# Patient Record
Sex: FEMALE | Race: WHITE | NOT HISPANIC OR LATINO | Employment: OTHER | URBAN - METROPOLITAN AREA
[De-identification: names, ages, dates, MRNs, and addresses within clinical notes are randomized per-mention and may not be internally consistent; named-entity substitution may affect disease eponyms.]

---

## 2024-01-12 PROBLEM — E55.9 VITAMIN D DEFICIENCY: Status: ACTIVE | Noted: 2024-01-12

## 2024-01-12 PROBLEM — M45.9 ANKYLOSING SPONDYLITIS (MULTI): Status: ACTIVE | Noted: 2024-01-12

## 2024-01-12 PROBLEM — R53.83 FATIGUE: Status: ACTIVE | Noted: 2024-01-12

## 2024-01-12 PROBLEM — M47.816 ARTHRITIS OF LUMBAR SPINE: Status: ACTIVE | Noted: 2024-01-12

## 2024-01-12 PROBLEM — M54.50 LUMBAR PAIN WITH RADIATION DOWN LEG: Status: ACTIVE | Noted: 2024-01-12

## 2024-01-12 PROBLEM — M79.606 LUMBAR PAIN WITH RADIATION DOWN LEG: Status: ACTIVE | Noted: 2024-01-12

## 2024-01-12 PROBLEM — E66.9 OBESITY (BMI 30-39.9): Status: ACTIVE | Noted: 2024-01-12

## 2024-01-12 PROBLEM — K21.9 GERD (GASTROESOPHAGEAL REFLUX DISEASE): Status: ACTIVE | Noted: 2024-01-12

## 2024-01-12 PROBLEM — Z15.89 HLA B27 (HLA B27 POSITIVE): Status: ACTIVE | Noted: 2024-01-12

## 2024-01-12 RX ORDER — ADALIMUMAB 40MG/0.4ML
KIT SUBCUTANEOUS
COMMUNITY
Start: 2021-04-29

## 2024-01-12 RX ORDER — TURMERIC 400 MG
CAPSULE ORAL
COMMUNITY
Start: 2021-04-29

## 2024-01-15 ENCOUNTER — APPOINTMENT (OUTPATIENT)
Dept: PRIMARY CARE | Facility: CLINIC | Age: 34
End: 2024-01-15
Payer: COMMERCIAL

## 2024-01-16 ENCOUNTER — APPOINTMENT (OUTPATIENT)
Dept: PRIMARY CARE | Facility: CLINIC | Age: 34
End: 2024-01-16
Payer: COMMERCIAL

## 2024-01-17 ENCOUNTER — OFFICE VISIT (OUTPATIENT)
Dept: PRIMARY CARE | Facility: CLINIC | Age: 34
End: 2024-01-17
Payer: COMMERCIAL

## 2024-01-17 VITALS
DIASTOLIC BLOOD PRESSURE: 80 MMHG | BODY MASS INDEX: 34.38 KG/M2 | HEART RATE: 96 BPM | SYSTOLIC BLOOD PRESSURE: 120 MMHG | HEIGHT: 63 IN | WEIGHT: 194 LBS | OXYGEN SATURATION: 95 %

## 2024-01-17 DIAGNOSIS — F41.9 ANXIETY: Primary | ICD-10-CM

## 2024-01-17 DIAGNOSIS — R00.2 PALPITATIONS: ICD-10-CM

## 2024-01-17 DIAGNOSIS — R30.0 DYSURIA: ICD-10-CM

## 2024-01-17 LAB
POC APPEARANCE, URINE: CLEAR
POC BILIRUBIN, URINE: NEGATIVE
POC BLOOD, URINE: NEGATIVE
POC COLOR, URINE: ABNORMAL
POC GLUCOSE, URINE: NEGATIVE MG/DL
POC KETONES, URINE: NEGATIVE MG/DL
POC LEUKOCYTES, URINE: NEGATIVE
POC NITRITE,URINE: NEGATIVE
POC PH, URINE: 7.5 PH
POC PROTEIN, URINE: NEGATIVE MG/DL
POC SPECIFIC GRAVITY, URINE: 1.02
POC UROBILINOGEN, URINE: 0.2 EU/DL

## 2024-01-17 PROCEDURE — 99214 OFFICE O/P EST MOD 30 MIN: CPT | Performed by: NURSE PRACTITIONER

## 2024-01-17 PROCEDURE — 1036F TOBACCO NON-USER: CPT | Performed by: NURSE PRACTITIONER

## 2024-01-17 PROCEDURE — 81002 URINALYSIS NONAUTO W/O SCOPE: CPT | Performed by: NURSE PRACTITIONER

## 2024-01-17 PROCEDURE — 87086 URINE CULTURE/COLONY COUNT: CPT

## 2024-01-17 RX ORDER — METOPROLOL TARTRATE 25 MG/1
25 TABLET, FILM COATED ORAL 2 TIMES DAILY
Qty: 60 TABLET | Refills: 5 | Status: SHIPPED | OUTPATIENT
Start: 2024-01-17 | End: 2024-07-15

## 2024-01-17 RX ORDER — PHENAZOPYRIDINE HYDROCHLORIDE 200 MG/1
TABLET, FILM COATED ORAL
COMMUNITY
Start: 2024-01-12 | End: 2024-02-26 | Stop reason: ALTCHOICE

## 2024-01-17 RX ORDER — FAMOTIDINE 20 MG/1
20 TABLET, FILM COATED ORAL 2 TIMES DAILY
COMMUNITY
Start: 2023-05-05

## 2024-01-17 RX ORDER — ALPRAZOLAM 0.25 MG/1
0.25 TABLET ORAL 2 TIMES DAILY PRN
Qty: 14 TABLET | Refills: 0 | Status: SHIPPED | OUTPATIENT
Start: 2024-01-17 | End: 2024-02-01 | Stop reason: SDUPTHER

## 2024-01-17 RX ORDER — HYDROXYZINE HYDROCHLORIDE 25 MG/1
25 TABLET, FILM COATED ORAL EVERY 6 HOURS PRN
COMMUNITY
Start: 2024-01-12 | End: 2024-02-26 | Stop reason: ALTCHOICE

## 2024-01-17 ASSESSMENT — PATIENT HEALTH QUESTIONNAIRE - PHQ9
SUM OF ALL RESPONSES TO PHQ9 QUESTIONS 1 & 2: 0
1. LITTLE INTEREST OR PLEASURE IN DOING THINGS: NOT AT ALL
2. FEELING DOWN, DEPRESSED OR HOPELESS: NOT AT ALL

## 2024-01-17 NOTE — PROGRESS NOTES
"Subjective   Patient ID: Aimee Tanner is a 33 y.o. female who presents for anxiety.    HPI     Pt tx at [facility] 1/12/24 for c/o anxiety and possible UTI.   CBC, CMP, TSH, UA completed and WNL    Anxiety increased last week  She was discharged with Hydroxyzine and Pyridium.   Provided a referral for psychiatry and counseling.     Still with UTI symptoms like urgency and incomplete bowel emptying. Is on Humira and feels she is prone to UTI.   Increasing water, Cranberry D-Mannose     Anxiety and Palpitations:   Childhood started as child. Never has tx.   Has taken Hydroxyzine 3 times but it did not work.   She has already made contact with a counselor for an appointment  Denies suicidal or homicidal thoughts    Stop caffeine  Water: tons  Nicotine or vape: Neg    Review of Systems    Objective   /80   Pulse 96   Ht 1.607 m (5' 3.25\")   Wt 88 kg (194 lb)   SpO2 95%   BMI 34.09 kg/m²     Physical Exam    Alert and oriented x 3  Appears healthy  Does not appear/sound dyspneic with conversation  Speech clear.  Hearing adequate.  Psych: Normal affect. Good judgment and insight.       Assessment/Plan     1. Dysuria  - POCT UA (nonautomated) manually resulted  - Urine Culture; Future  - Urine Culture    2. Anxiety  Discussed meds and plan of action  Aware at any time if thoughts of suicide or homicide are present contact medical services immediately.    - metoprolol tartrate (Lopressor) 25 mg tablet; Take 1 tablet (25 mg) by mouth 2 times a day.  Dispense: 60 tablet; Refill: 5  - ALPRAZolam (Xanax) 0.25 mg tablet; Take 1 tablet (0.25 mg) by mouth 2 times a day as needed for anxiety.  Dispense: 14 tablet; Refill: 0    3. Palpitations  Discussed meds and plan of action  - metoprolol tartrate (Lopressor) 25 mg tablet; Take 1 tablet (25 mg) by mouth 2 times a day.  Dispense: 60 tablet; Refill: 5  - ALPRAZolam (Xanax) 0.25 mg tablet; Take 1 tablet (0.25 mg) by mouth 2 times a day as needed for anxiety.  " Dispense: 14 tablet; Refill: 0    Follow up: for anxiety in the next 8 weeks    Medications refills will be completed as discussed.     Any labs or testing that is ordered will be reviewed and the results will be in your chart .   You can review these via  [employer].     Prescriptions will not be filled unless you are compliant with your follow-up appointments or have a follow-up appointment scheduled as per the instruction of your provider. Refills for medications should be requested at the time of your office visit.     Please allow one week for refill requests to be completed.     Contact office with any questions or concerns.   Preferred communication is via  [employer]  Please contact [email or web address] if having issues with  [employer]    Kasia Donnelly [facility]  [address], [address]  [place] [address]  Phone: [phone]    **Charting was completed using voice recognition technology and may include unintended errors**

## 2024-01-18 LAB — BACTERIA UR CULT: NORMAL

## 2024-01-25 ENCOUNTER — TELEPHONE (OUTPATIENT)
Dept: PRIMARY CARE | Facility: CLINIC | Age: 34
End: 2024-01-25
Payer: COMMERCIAL

## 2024-01-25 DIAGNOSIS — R30.0 DYSURIA: Primary | ICD-10-CM

## 2024-01-25 RX ORDER — NITROFURANTOIN 25; 75 MG/1; MG/1
100 CAPSULE ORAL 2 TIMES DAILY
Qty: 10 CAPSULE | Refills: 0 | Status: SHIPPED | OUTPATIENT
Start: 2024-01-25 | End: 2024-01-30

## 2024-01-25 NOTE — TELEPHONE ENCOUNTER
Aimee called to see if you had the results from the urine test yet.  She is trying to set up her my chart right now.  Please advise.

## 2024-02-01 ENCOUNTER — TELEPHONE (OUTPATIENT)
Dept: PRIMARY CARE | Facility: CLINIC | Age: 34
End: 2024-02-01
Payer: COMMERCIAL

## 2024-02-01 DIAGNOSIS — F41.9 ANXIETY: ICD-10-CM

## 2024-02-01 DIAGNOSIS — R00.2 PALPITATIONS: ICD-10-CM

## 2024-02-01 RX ORDER — ALPRAZOLAM 0.25 MG/1
0.25 TABLET ORAL DAILY PRN
Qty: 14 TABLET | Refills: 0 | Status: SHIPPED | OUTPATIENT
Start: 2024-02-01 | End: 2024-03-06 | Stop reason: SDUPTHER

## 2024-02-01 NOTE — TELEPHONE ENCOUNTER
Pt is calling and wanted to know if she can get a refill on her Alprazolam 0.25 MG? Pt states that she is leaving for vacation on Sunday and wanted to know if this can be sent today if possible so she can pick it up before she leaves?     Medication:     Alprazolam 0.25 MG; Take 1 tablet 2 times a day as needed for anxiety.    Pharmacy: [facility]   Phone Number: [phone]    LR: 1-17-24     14 tablets with 0 refills   LV: 1-17-24  NV: 2-26-24

## 2024-02-23 PROBLEM — R14.0 ABDOMINAL BLOATING: Status: ACTIVE | Noted: 2024-02-23

## 2024-02-26 ENCOUNTER — OFFICE VISIT (OUTPATIENT)
Dept: PRIMARY CARE | Facility: CLINIC | Age: 34
End: 2024-02-26
Payer: COMMERCIAL

## 2024-02-26 VITALS
SYSTOLIC BLOOD PRESSURE: 118 MMHG | HEIGHT: 63 IN | DIASTOLIC BLOOD PRESSURE: 70 MMHG | WEIGHT: 189 LBS | BODY MASS INDEX: 33.49 KG/M2

## 2024-02-26 DIAGNOSIS — F41.9 ANXIETY: ICD-10-CM

## 2024-02-26 DIAGNOSIS — K21.9 GASTROESOPHAGEAL REFLUX DISEASE WITHOUT ESOPHAGITIS: ICD-10-CM

## 2024-02-26 DIAGNOSIS — M45.9 ANKYLOSING SPONDYLITIS, UNSPECIFIED SITE OF SPINE (MULTI): ICD-10-CM

## 2024-02-26 DIAGNOSIS — R00.2 PALPITATIONS: Primary | ICD-10-CM

## 2024-02-26 PROBLEM — R30.0 DYSURIA: Status: RESOLVED | Noted: 2024-01-17 | Resolved: 2024-02-26

## 2024-02-26 PROCEDURE — 1036F TOBACCO NON-USER: CPT | Performed by: NURSE PRACTITIONER

## 2024-02-26 PROCEDURE — 99213 OFFICE O/P EST LOW 20 MIN: CPT | Performed by: NURSE PRACTITIONER

## 2024-02-26 RX ORDER — OMEPRAZOLE 20 MG/1
20 TABLET, DELAYED RELEASE ORAL
COMMUNITY

## 2024-02-26 NOTE — PROGRESS NOTES
"Subjective   Patient ID: Aimee To is a 33 y.o. female who presents for 1 mo follow up on anxiety.    HPI   Presents for follow up for anxiety and palpitations.  Last visit she was started on Metoprolol Tartrate. States she is taking it 2 times a day with great results.   She initially was taking Xanax but has not needed it in weeks.  She has met with a counselor and plans on meeting weekly.   Very happy with overall results    Ankylosing spondylitis:  Rheum Dr Huang Barriga   Med: Humira    GERD: GI [place] Waleska Angeles CNP  Med: Omeprazole, Pepcid        Review of Systems    Objective   /70   Ht 1.607 m (5' 3.25\")   Wt 85.7 kg (189 lb)   BMI 33.22 kg/m²     Physical Exam    Alert and oriented x 3  Appears healthy  Does not appear/sound dyspneic with conversation  Speech clear.  Hearing adequate.  Psych: Normal affect. Good judgment and insight.       Assessment/Plan     1. Ankylosing spondylitis, unspecified site of spine (CMS/HCC)  Follow-up with specialist per their discretion/direction.     2. Palpitations  Stable.  Continue current medications.    3. Anxiety  Stable.  Continue current medications.    4. Gastroesophageal reflux disease without esophagitis  Follow-up with specialist per their discretion/direction.     Follow up: CPE     Medications refills will be completed as discussed.     Any labs or testing that is ordered will be reviewed and the results will be in your chart .   You can review these via  [employer].     Prescriptions will not be filled unless you are compliant with your follow-up appointments or have a follow-up appointment scheduled as per the instruction of your provider. Refills for medications should be requested at the time of your office visit.     Please allow one week for refill requests to be completed.     Contact office with any questions or concerns.   Preferred communication is via  [employer]  Please contact [email or web address] if having issues " with  Savi Donnelly [facility]  [address], [address]  [place] [address]  Phone: [phone]    **Charting was completed using voice recognition technology and may include unintended errors**

## 2024-03-06 DIAGNOSIS — R00.2 PALPITATIONS: ICD-10-CM

## 2024-03-06 DIAGNOSIS — F41.9 ANXIETY: ICD-10-CM

## 2024-03-06 RX ORDER — ALPRAZOLAM 0.25 MG/1
0.25 TABLET ORAL DAILY PRN
Qty: 7 TABLET | Refills: 0 | Status: SHIPPED | OUTPATIENT
Start: 2024-03-06

## 2024-04-03 ENCOUNTER — APPOINTMENT (OUTPATIENT)
Dept: PRIMARY CARE | Facility: CLINIC | Age: 34
End: 2024-04-03
Payer: COMMERCIAL

## 2024-04-03 NOTE — PROGRESS NOTES
Annual Comprehensive Medical Exam:    33 y.o. female presents for annual comprehensive medical evaluation and preventive services screening.      Recent hospitalizations, surgeries or ER visits: denies     Diet:  healthy      unhealthy     mix of healthy and unhealthy  Caffeine per day:   Water per day: lots  Exercise:  Alcohol:  Tobacco:  quit   Pap/Pelvic:   Mammogram:   Colonoscopy:   date                 f/u  Cologuard:    Allowed to report any questions or concerns.    Anxiety:   Med: Metoprolol Tartrate  Controlled substance: Xanax  Xanax Last filled: 3/6/24  Xanax Last taken:   Meeting with counselor    Ankylosing spondylitis:  Rheum Dr Huang Barriga   Med: Humira     GERD: GI [place] Waleska Angeles CNP  Med: Omeprazole, Pepcid    Past Medical History:   Diagnosis Date    Other conditions influencing health status 2020    Patient denies significant medical history      Past Surgical History:   Procedure Laterality Date    OTHER SURGICAL HISTORY  2020    No history of surgery     No family history on file.   Social History     Socioeconomic History    Marital status:      Spouse name: Not on file    Number of children: Not on file    Years of education: Not on file    Highest education level: Not on file   Occupational History    Not on file   Tobacco Use    Smoking status: Former     Types: Cigarettes     Quit date:      Years since quittin.2    Smokeless tobacco: Never   Substance and Sexual Activity    Alcohol use: Yes    Drug use: Never    Sexual activity: Not on file   Other Topics Concern    Not on file   Social History Narrative    Not on file     Social Determinants of Health     Financial Resource Strain: Not on file   Food Insecurity: Not on file   Transportation Needs: Not on file   Physical Activity: Not on file   Stress: Not on file   Social Connections: Not on file   Intimate Partner Violence: Not on file   Housing Stability: Not on file       Current Outpatient  Medications on File Prior to Visit   Medication Sig Dispense Refill    adalimumab (Humira,CF,) 40 mg/0.4 mL syringe kit prefilled syringe Inject under the skin.      ALPRAZolam (Xanax) 0.25 mg tablet Take 1 tablet (0.25 mg) by mouth once daily as needed for anxiety. 7 tablet 0    cranberry-vitamin C-mannose 250-30-50 mg tablet,chewable Chew.      famotidine (Pepcid) 20 mg tablet Take 1 tablet (20 mg) by mouth 2 times a day. AS DIRECTED      metoprolol tartrate (Lopressor) 25 mg tablet Take 1 tablet (25 mg) by mouth 2 times a day. 60 tablet 5    omeprazole OTC (PriLOSEC OTC) 20 mg EC tablet Take 1 tablet (20 mg) by mouth once daily in the morning. Take before meals. Do not crush, chew, or split.      turmeric 400 mg capsule Take by mouth.       No current facility-administered medications on file prior to visit.       Allergies   Allergen Reactions    Azithromycin Hives and Rash     Prescribed by outside MD, [facility]    Mold Other    Penicillins Hives and Unknown    Sulfa (Sulfonamide Antibiotics) Hives and Unknown    Clindamycin Hives       ROS:   Refer to HPI  Denies fever, CP, SOB, headaches or GI upset    Visit Vitals  Smoking Status Former        Physical Exam  Gen: Alert and oriented x3 female in no acute distress.  HEENT: Head is normocephalic.  Pupils equal and reactive to light.  Tympanic membranes are clear.  Neck is supple without adenopathy or carotid bruits.  Heart: Regular rate and rhythm without murmurs.  Lungs: Clear to auscultation bilaterally.  Breasts: Normal appearance, no nipple discharge. Palpation of breast and axillae normal. No palpable mass and no axillary lymphadenopathy.   Breast: Declined.            Deferred to Gyn  Pelvic: Declined.            Deferred to Gyn   Abdomen: Soft with normal bowel sounds.  No masses or pain to palpation.  No bruits auscultated.  Extremities: Good range of motion of all joints.  No significant edema. Pedal pulses +1-2/4  Neuro: No signs of focal  neurologic deficit.  No tremor.  Speech and hearing are normal.  DTRs +3/4;  Muscle Strength +5/5.  Musculoskeletal: Spine with good ROM.  Leg lengths are equal.  Skin: No significant or irregular nevi visualized.  Psych: normal affect.  No suicidal ideation.  Good judgment and insight.    Diagnosis/Plan:         Medications refills will be completed as discussed.     Any labs or testing that is ordered will be reviewed and the results will be in your chart .   You can review these via  [employer].     Follow up six months for medication management.     Prescriptions will not be filled unless you are compliant with your follow-up appointments or have a follow-up appointment scheduled as per the instruction of your provider. Refills for medications should be requested at the time of your office visit.     Please allow one week for refill requests to be completed.     Contact office with any questions or concerns.   Preferred communication is via  [employer]  Please contact [email or web address] if having issues with  [employer]    Kasia Donnelly [facility]  [address], [address]  [place] [address]  Phone: [phone]    **Charting was completed using voice recognition technology and may include unintended errors**

## 2024-05-06 ENCOUNTER — PATIENT MESSAGE (OUTPATIENT)
Dept: PRIMARY CARE | Facility: CLINIC | Age: 34
End: 2024-05-06
Payer: COMMERCIAL

## 2024-05-06 DIAGNOSIS — F41.9 ANXIETY: Primary | ICD-10-CM

## 2024-08-30 ENCOUNTER — PATIENT MESSAGE (OUTPATIENT)
Dept: PRIMARY CARE | Facility: CLINIC | Age: 34
End: 2024-08-30
Payer: COMMERCIAL

## 2024-08-30 DIAGNOSIS — R00.2 PALPITATIONS: ICD-10-CM

## 2024-08-30 DIAGNOSIS — F41.9 ANXIETY: ICD-10-CM

## 2024-09-04 RX ORDER — METOPROLOL TARTRATE 25 MG/1
25 TABLET, FILM COATED ORAL 2 TIMES DAILY
Qty: 60 TABLET | Refills: 5 | Status: SHIPPED | OUTPATIENT
Start: 2024-09-04 | End: 2025-03-03

## 2025-03-07 ENCOUNTER — PATIENT MESSAGE (OUTPATIENT)
Dept: PRIMARY CARE | Facility: CLINIC | Age: 35
End: 2025-03-07
Payer: COMMERCIAL

## 2025-03-07 DIAGNOSIS — F41.9 ANXIETY: ICD-10-CM

## 2025-03-07 DIAGNOSIS — R00.2 PALPITATIONS: ICD-10-CM

## 2025-03-08 RX ORDER — METOPROLOL TARTRATE 25 MG/1
25 TABLET, FILM COATED ORAL 2 TIMES DAILY
Qty: 60 TABLET | Refills: 5 | Status: SHIPPED | OUTPATIENT
Start: 2025-03-08 | End: 2025-09-04

## 2025-04-30 ENCOUNTER — APPOINTMENT (OUTPATIENT)
Dept: PRIMARY CARE | Facility: CLINIC | Age: 35
End: 2025-04-30
Payer: COMMERCIAL

## 2025-04-30 DIAGNOSIS — M45.9 ANKYLOSING SPONDYLITIS, UNSPECIFIED SITE OF SPINE (MULTI): ICD-10-CM

## 2025-04-30 DIAGNOSIS — E55.9 VITAMIN D DEFICIENCY: ICD-10-CM

## 2025-04-30 DIAGNOSIS — Z11.59 NEED FOR HEPATITIS C SCREENING TEST: ICD-10-CM

## 2025-04-30 DIAGNOSIS — Z00.00 HEALTHCARE MAINTENANCE: Primary | ICD-10-CM

## 2025-04-30 PROCEDURE — 1036F TOBACCO NON-USER: CPT | Performed by: NURSE PRACTITIONER

## 2025-04-30 NOTE — PROGRESS NOTES
Annual Comprehensive Medical Exam:    34 y.o. female presents for annual comprehensive medical evaluation and preventive services screening.      Recent hospitalizations, surgeries or ER visits: denies     Diet:  healthy      unhealthy     mix of healthy and unhealthy  Caffeine per day:   Water per day:   Exercise:  Alcohol:  Tobacco:   Pap/Pelvic:   Mammogram:   DEXA:   Colonoscopy:   date                 f/u  Cologuard:    Allowed to report any questions or concerns.    Ankylosing spondylitis sacral and sacrococcygeal region:  Rheum Dr Huang Barriga  Med: Humira     Anxiety and Depression:  Med: Metoprolol tartrate   Feels stable on current dose.    Denies thoughts of suicide or homicide.    GERD:   Med: Pepcid    Medical History[1]   Surgical History[2]  Family History[3]   Social History     Socioeconomic History    Marital status:      Spouse name: Not on file    Number of children: Not on file    Years of education: Not on file    Highest education level: Not on file   Occupational History    Not on file   Tobacco Use    Smoking status: Former     Current packs/day: 0.00     Types: Cigarettes     Quit date:      Years since quittin.3    Smokeless tobacco: Never   Substance and Sexual Activity    Alcohol use: Yes    Drug use: Never    Sexual activity: Not on file   Other Topics Concern    Not on file   Social History Narrative    Not on file     Social Drivers of Health     Financial Resource Strain: Not on file   Food Insecurity: Not on file   Transportation Needs: Not on file   Physical Activity: Not on file   Stress: Not on file   Social Connections: Not on file   Intimate Partner Violence: Not on file   Housing Stability: Not on file       Medications Ordered Prior to Encounter[4]    Allergies[5]      Visit Vitals  Smoking Status Former        Physical Exam  Gen: Alert and oriented x3 female in no acute distress.  HEENT: Head is normocephalic.  Neck is supple without carotid bruits  Heart:  Regular rate and rhythm without murmurs.  Lungs: Clear to auscultation bilaterally.  Breasts: Normal appearance, no nipple discharge. Palpation of breast and axillae normal. No palpable mass and no axillary lymphadenopathy.   Breast: Declined.            Deferred to Gyn  Pelvic: Declined.            Deferred to Gyn   Abdomen: Soft with normal bowel sounds.  No masses or pain to palpation.    Extremities: Good range of motion of all joints.  No significant edema. Pedal pulses +1-2/4  Neuro: No signs of focal neurologic deficit.  No tremor.  Speech and hearing are normal.  DTRs +3/4;  Muscle Strength +5/5.  Musculoskeletal: Spine with good ROM.  Leg lengths are equal.  Skin: No significant or irregular nevi visualized.  Psych: normal affect.  No suicidal ideation.  Good judgment and insight.    Diagnosis/Plan:         Medications refills will be completed as discussed.     Any labs or testing that is ordered will be reviewed and the results will be in your chart .   You can review these via  [employer].     Follow up six months for medication management.     Prescriptions will not be filled unless you are compliant with your follow-up appointments or have a follow-up appointment scheduled as per the instruction of your provider. Refills for medications should be requested at the time of your office visit.     Please allow one week for refill requests to be completed.     [employer] can help with scheduling referrals: [phone]   Mammogram [phone]  Physical Therapy [phone]    Contact office with any questions or concerns.   Preferred communication is via  [employer]  Please contact [email or web address] if having issues with  [employer]    Kasia Donnelly [facility]  [address], [address]  [place] [address]  Phone: [phone]    **Charting was completed using voice recognition technology and may include unintended  errors**                        [1]   Past Medical History:  Diagnosis Date    Other conditions influencing health status 01/21/2020    Patient denies significant medical history   [2]   Past Surgical History:  Procedure Laterality Date    OTHER SURGICAL HISTORY  01/21/2020    No history of surgery   [3] No family history on file.  [4]   Current Outpatient Medications on File Prior to Visit   Medication Sig Dispense Refill    adalimumab (Humira,CF,) 40 mg/0.4 mL syringe kit prefilled syringe Inject under the skin.      ALPRAZolam (Xanax) 0.25 mg tablet Take 1 tablet (0.25 mg) by mouth once daily as needed for anxiety. 7 tablet 0    cranberry-vitamin C-mannose 250-30-50 mg tablet,chewable Chew.      famotidine (Pepcid) 20 mg tablet Take 1 tablet (20 mg) by mouth 2 times a day. AS DIRECTED      metoprolol tartrate (Lopressor) 25 mg tablet Take 1 tablet (25 mg) by mouth 2 times a day. 60 tablet 5    omeprazole OTC (PriLOSEC OTC) 20 mg EC tablet Take 1 tablet (20 mg) by mouth once daily in the morning. Take before meals. Do not crush, chew, or split.      turmeric 400 mg capsule Take by mouth.       No current facility-administered medications on file prior to visit.   [5]   Allergies  Allergen Reactions    Azithromycin Hives and Rash     Prescribed by outside MD, [facility]    Mold Other    Penicillins Hives and Unknown    Sulfa (Sulfonamide Antibiotics) Hives and Unknown    Clindamycin Hives

## 2025-07-02 ENCOUNTER — APPOINTMENT (OUTPATIENT)
Dept: PRIMARY CARE | Facility: CLINIC | Age: 35
End: 2025-07-02
Payer: COMMERCIAL